# Patient Record
Sex: FEMALE | Race: WHITE | Employment: OTHER | URBAN - METROPOLITAN AREA
[De-identification: names, ages, dates, MRNs, and addresses within clinical notes are randomized per-mention and may not be internally consistent; named-entity substitution may affect disease eponyms.]

---

## 2020-08-04 ENCOUNTER — TRANSFERRED RECORDS (OUTPATIENT)
Dept: HEALTH INFORMATION MANAGEMENT | Facility: CLINIC | Age: 45
End: 2020-08-04

## 2020-08-27 ENCOUNTER — TELEPHONE (OUTPATIENT)
Dept: ADMINISTRATIVE | Facility: CLINIC | Age: 45
End: 2020-08-27

## 2020-09-04 ENCOUNTER — VIRTUAL VISIT (OUTPATIENT)
Dept: ONCOLOGY | Facility: CLINIC | Age: 45
End: 2020-09-04
Payer: MEDICARE

## 2020-09-04 VITALS — BODY MASS INDEX: 39.9 KG/M2 | WEIGHT: 186 LBS

## 2020-09-04 DIAGNOSIS — E66.9 OBESITY: Primary | ICD-10-CM

## 2020-09-04 PROCEDURE — 97802 MEDICAL NUTRITION INDIV IN: CPT | Mod: 95 | Performed by: DIETITIAN, REGISTERED

## 2020-09-04 NOTE — PROGRESS NOTES
"Meeta Moore is a 44 year old female who is being evaluated via a billable telephone visit.      The patient has been notified of following:     \"This telephone visit will be conducted via a call between you and your physician/provider. We have found that certain health care needs can be provided without the need for a physical exam.  This service lets us provide the care you need with a short phone conversation.  If a prescription is necessary we can send it directly to your pharmacy.  If lab work is needed we can place an order for that and you can then stop by our lab to have the test done at a later time.    Telephone visits are billed at different rates depending on your insurance coverage. During this emergency period, for some insurers they may be billed the same as an in-person visit.  Please reach out to your insurance provider with any questions.    If during the course of the call the physician/provider feels a telephone visit is not appropriate, you will not be charged for this service.\"    Patient has given verbal consent for Telephone visit?  Yes    What phone number would you like to be contacted at? [phone]    Phone call duration: 60 minutes      CLINICAL NUTRITION SERVICES - ASSESSMENT NOTE    Meeta Moore 44 year old referred for MNT related to obesity    Time Spent: 60 minutes  Visit Type: phone  Referring Physician: Lis Alonso CNP 8/27  Pt accompanied by: her mother Tiara    NUTRITION HISTORY  Current diet: general    Meeta's mother, Tiara, tells me that she has been gaining weight since Covid started back in March.    She has noticed that she is drinking more diet coke and eating a lot of simple carbs, fast foods and junk foods.         Diet Recall  Breakfast 1 large bagel with cream cheese OR 3 eggs, 2 pc toast with butter, 1 cup 2% milk   Lunch 3 pc pizza or 2 cheeseburgers   Dinner Take out TGIF burgers, fries, 32 oz diet coke   Snacks Fruit, sweets   Beverages >32 oz diet coke, " "1-2 cup 2% milk, 3-4 cups water   Carry out 2 times/week      ANTHROPOMETRICS  Height: 59\"  Weight: 186 lbs/84kg (per pt mother's report) on home scale  BMI: 40  Weight Status:  Obesity Grade III BMI >40  IBW: 95 lbs  % IBW: 200  Weight History:   Wt Readings from Last 6 Encounters:   09/04/20 84.4 kg (186 lb)   01/07/15 74.4 kg (164 lb)   07/28/09 76.7 kg (169 lb 3.2 oz)   02/12/09 73.9 kg (163 lb)   02/08/07 68 kg (150 lb)   05/15/06 68.9 kg (152 lb)     Dosing Weight: 53kg      Medications/vitamins/minerals/herbals:   Reviewed    Labs:  Labs reviewed    Physical Findings:  NA    ASSESSED NUTRITION NEEDS:  Estimated Energy Needs: <1200 kcals (15-20 Kcal/Kg)  Justification: obese  Estimated Protein Needs: 53 grams protein (1 g pro/Kg)  Justification: maintenance  Estimated Fluid Needs: 1500  mL   Justification: maintenance    MALNUTRITION:  % Weight Loss:  None noted  % Intake:  No decreased intake noted  Subcutaneous Fat Loss:  None observed  Muscle Loss:  None observed  Fluid Retention:  None noted    Malnutrition Diagnosis: Patient does not meet two of the above criteria necessary for diagnosing malnutrition    NUTRITION DIAGNOSIS:  Obesity related to self-monitoring deficit, disordered eating patterns ane excessive energy intake AEB diet recall and BMI >30      INTERVENTIONS  Provided written & verbal education:   Discussed dietary and behavioral modifications to promote weight loss (portion control, meal consistency, measuring foods, 9\"portion plate method, fiber sources, protein sources, eating pace, exercise and avoidance of disordered eating patterns.    Encouraged the following behaviors:  --Start using portion plate model  --Cut back on diet coke to <12 oz as able  --Choose 3 food groups at each meal versus all carbs      Pt verbalize understanding of materials provided during consult.   Patient Understanding: unable to determine (Mother with excellent understanding)  Expected Compliance: unable to " determine (Mother with presumed excellent compliance)    Goals  1.  Aim for <1200kcal /day   2.  Reduce diet coke to <12 oz/day as able  3. Start using a portion plate  4. Weight loss (0.5-1.0 lb /week desirable)      Follow-Up Plans: Pt has RD contact information for questions - scheduled for Oct 2nd at 10am.    MONITORING AND EVALUATION:  -Food intake  -Weight trends    Arline Santa, CHERELLEN, LDN

## 2020-10-02 ENCOUNTER — VIRTUAL VISIT (OUTPATIENT)
Dept: ONCOLOGY | Facility: CLINIC | Age: 45
End: 2020-10-02
Payer: MEDICARE

## 2020-10-02 DIAGNOSIS — E66.9 OBESITY: Primary | ICD-10-CM

## 2020-10-02 PROCEDURE — 999N001193 HC VIDEO/TELEPHONE VISIT; NO CHARGE

## 2020-10-02 PROCEDURE — 97803 MED NUTRITION INDIV SUBSEQ: CPT | Mod: TEL | Performed by: DIETITIAN, REGISTERED

## 2020-10-02 NOTE — LETTER
Date:October 6, 2020      Patient was self referred, no letter generated. Do not send.        [facility] Health Information

## 2020-10-02 NOTE — LETTER
"    10/2/2020         RE: Meeta Moore  [address]  [facility] [address]        Dear Colleague,    Thank you for referring your patient, Meeta Moore, to the [facility]. Please see a copy of my visit note below.    Meeta Moore is a 44 year old female who is being evaluated via a billable telephone visit.      The patient has been notified of following:     \"This telephone visit will be conducted via a call between you and your physician/provider. We have found that certain health care needs can be provided without the need for a physical exam.  This service lets us provide the care you need with a short phone conversation.  If a prescription is necessary we can send it directly to your pharmacy.  If lab work is needed we can place an order for that and you can then stop by our lab to have the test done at a later time.    Telephone visits are billed at different rates depending on your insurance coverage. During this emergency period, for some insurers they may be billed the same as an in-person visit.  Please reach out to your insurance provider with any questions.    If during the course of the call the physician/provider feels a telephone visit is not appropriate, you will not be charged for this service.\"    Patient has given verbal consent for Telephone visit?  Yes    What phone number would you like to be contacted at? Mother, Tiara at [phone]    CLINICAL NUTRITION SERVICES - REASSESSMENT NOTE   EVALUATION OF PREVIOUS PLAN OF CARE:   Meeta Moore 44 year old referred for MNT related to obesity     Time Spent: 30 minutes  Visit Type: phone  Referring Physician: Lis Alonso CNP 8/27  Pt accompanied by: her mother Tiara     Monitoring from previous assessment:   -Food intake - Meeta has made some great diet and behavioral changes over the past 1 month.  Although, she hasn't lost any weight, she is encouraged by her ability to choose smaller portions and " cut back on soda.    -Fluid/beverage intake - She has been drinking <12 oz diet coke, <8 oz juice and >32 oz water. She also drinks 8 oz V8 juice  -Weight trends - no weight loss noted  Wt Readings from Last 4 Encounters:   09/04/20 84.4 kg (186 lb)   01/07/15 74.4 kg (164 lb)   07/28/09 76.7 kg (169 lb 3.2 oz)   02/12/09 73.9 kg (163 lb)         Previous Goals:   1.  Aim for <1200kcal /day - goal not met (not tracking)  2.  Reduce diet coke to <12 oz/day as able - goal met  3. Start using a portion plate - unable to evalulate  4. Weight loss (0.5-1.0 lb /week desirable) - goal not met  Evaluation: Not met   Previous Nutrition Diagnosis:   Obesity related to self-monitoring deficit, disordered eating patterns ane excessive energy intake AEB diet recall and BMI >30  Evaluation: No change   NEW FINDINGS:   --diet changes as mentioned above   CURRENT NUTRITION DIAGNOSIS   Obesity related to self-monitoring deficit, disordered eating patterns ane excessive energy intake AEB diet recall and BMI >30  INTERVENTIONS   Recommendations / Nutrition Prescription   1. 1200kcal - follow meal plan provided to mother, Tiara and [facility]  2.  3 meals/day; minimize snacking to 1 snack/day (<100kcal/snack)  Implementation  General/healthful diet:  --Reviewed above goals and praised patient for behavioral and diet changes thus far.    --Reviewed calorie goals for desired wt loss. Encouraged to start tracking as this will help with accountability.   --Encouraged to had light weight/body weight activities 15-30 minutes/day     Goals:  1.  Aim for <1200kcal /day (meal plan provided)  2.  Reduce diet coke to <12 oz/day as able  3. Start using a portion plate  4. Weight loss (0.5-1.0 lb /week desirable)        Follow-Up Plans: Pt has RD contact information for questions - no follow-up scheduled yet      MONITORING AND EVALUATION:  -Food intake  -Weight trends     Arline Santa, CHERELLEN, LDN      Again, thank you for allowing me to  participate in the care of your patient.        Sincerely,        Arline Santa RD

## 2020-10-02 NOTE — PROGRESS NOTES
"Meeta Moore is a 44 year old female who is being evaluated via a billable telephone visit.      The patient has been notified of following:     \"This telephone visit will be conducted via a call between you and your physician/provider. We have found that certain health care needs can be provided without the need for a physical exam.  This service lets us provide the care you need with a short phone conversation.  If a prescription is necessary we can send it directly to your pharmacy.  If lab work is needed we can place an order for that and you can then stop by our lab to have the test done at a later time.    Telephone visits are billed at different rates depending on your insurance coverage. During this emergency period, for some insurers they may be billed the same as an in-person visit.  Please reach out to your insurance provider with any questions.    If during the course of the call the physician/provider feels a telephone visit is not appropriate, you will not be charged for this service.\"    Patient has given verbal consent for Telephone visit?  Yes    What phone number would you like to be contacted at? Mother, Tiara at [phone]    CLINICAL NUTRITION SERVICES - REASSESSMENT NOTE   EVALUATION OF PREVIOUS PLAN OF CARE:   Meeta Moore 44 year old referred for MNT related to obesity     Time Spent: 30 minutes  Visit Type: phone  Referring Physician: Lis Alonso CNP 8/27  Pt accompanied by: her mother Tiara     Monitoring from previous assessment:   -Food intake - Meeta has made some great diet and behavioral changes over the past 1 month.  Although, she hasn't lost any weight, she is encouraged by her ability to choose smaller portions and cut back on soda.    -Fluid/beverage intake - She has been drinking <12 oz diet coke, <8 oz juice and >32 oz water. She also drinks 8 oz V8 juice  -Weight trends - no weight loss noted  Wt Readings from Last 4 Encounters:   09/04/20 84.4 kg (186 lb)   01/07/15 " 74.4 kg (164 lb)   07/28/09 76.7 kg (169 lb 3.2 oz)   02/12/09 73.9 kg (163 lb)         Previous Goals:   1.  Aim for <1200kcal /day - goal not met (not tracking)  2.  Reduce diet coke to <12 oz/day as able - goal met  3. Start using a portion plate - unable to evalulate  4. Weight loss (0.5-1.0 lb /week desirable) - goal not met  Evaluation: Not met   Previous Nutrition Diagnosis:   Obesity related to self-monitoring deficit, disordered eating patterns ane excessive energy intake AEB diet recall and BMI >30  Evaluation: No change   NEW FINDINGS:   --diet changes as mentioned above   CURRENT NUTRITION DIAGNOSIS   Obesity related to self-monitoring deficit, disordered eating patterns ane excessive energy intake AEB diet recall and BMI >30  INTERVENTIONS   Recommendations / Nutrition Prescription   1. 1200kcal - follow meal plan provided to mother, Tiara and [facility]  2.  3 meals/day; minimize snacking to 1 snack/day (<100kcal/snack)  Implementation  General/healthful diet:  --Reviewed above goals and praised patient for behavioral and diet changes thus far.    --Reviewed calorie goals for desired wt loss. Encouraged to start tracking as this will help with accountability.   --Encouraged to had light weight/body weight activities 15-30 minutes/day     Goals:  1.  Aim for <1200kcal /day (meal plan provided)  2.  Reduce diet coke to <12 oz/day as able  3. Start using a portion plate  4. Weight loss (0.5-1.0 lb /week desirable)        Follow-Up Plans: Pt has RD contact information for questions - no follow-up scheduled yet      MONITORING AND EVALUATION:  -Food intake  -Weight trends     Arline Santa, CHERELLEN, LDN

## 2025-01-27 ENCOUNTER — LAB REQUISITION (OUTPATIENT)
Dept: LAB | Facility: CLINIC | Age: 50
End: 2025-01-27
Payer: MEDICARE

## 2025-01-27 DIAGNOSIS — D64.9 ANEMIA, UNSPECIFIED: ICD-10-CM

## 2025-01-27 DIAGNOSIS — E78.5 HYPERLIPIDEMIA, UNSPECIFIED: ICD-10-CM

## 2025-01-27 DIAGNOSIS — E03.9 HYPOTHYROIDISM, UNSPECIFIED: ICD-10-CM

## 2025-01-27 LAB
ALBUMIN SERPL BCG-MCNC: 3.7 G/DL (ref 3.5–5.2)
ALP SERPL-CCNC: 95 U/L (ref 40–150)
ALT SERPL W P-5'-P-CCNC: 15 U/L (ref 0–50)
ANION GAP SERPL CALCULATED.3IONS-SCNC: 12 MMOL/L (ref 7–15)
AST SERPL W P-5'-P-CCNC: 19 U/L (ref 0–45)
BASOPHILS # BLD AUTO: 0.1 10E3/UL (ref 0–0.2)
BASOPHILS NFR BLD AUTO: 2 %
BILIRUB SERPL-MCNC: <0.2 MG/DL
BUN SERPL-MCNC: 16.6 MG/DL (ref 6–20)
CALCIUM SERPL-MCNC: 8.8 MG/DL (ref 8.8–10.4)
CHLORIDE SERPL-SCNC: 102 MMOL/L (ref 98–107)
CREAT SERPL-MCNC: 0.95 MG/DL (ref 0.51–0.95)
EGFRCR SERPLBLD CKD-EPI 2021: 74 ML/MIN/1.73M2
EOSINOPHIL # BLD AUTO: 0.1 10E3/UL (ref 0–0.7)
EOSINOPHIL NFR BLD AUTO: 1 %
ERYTHROCYTE [DISTWIDTH] IN BLOOD BY AUTOMATED COUNT: 13.4 % (ref 10–15)
EST. AVERAGE GLUCOSE BLD GHB EST-MCNC: 108 MG/DL
GLUCOSE SERPL-MCNC: 101 MG/DL (ref 70–99)
HBA1C MFR BLD: 5.4 %
HCO3 SERPL-SCNC: 22 MMOL/L (ref 22–29)
HCT VFR BLD AUTO: 40.7 % (ref 35–47)
HGB BLD-MCNC: 13.8 G/DL (ref 11.7–15.7)
IMM GRANULOCYTES # BLD: 0 10E3/UL
IMM GRANULOCYTES NFR BLD: 0 %
LYMPHOCYTES # BLD AUTO: 1.7 10E3/UL (ref 0.8–5.3)
LYMPHOCYTES NFR BLD AUTO: 31 %
MCH RBC QN AUTO: 33 PG (ref 26.5–33)
MCHC RBC AUTO-ENTMCNC: 33.9 G/DL (ref 31.5–36.5)
MCV RBC AUTO: 97 FL (ref 78–100)
MONOCYTES # BLD AUTO: 0.6 10E3/UL (ref 0–1.3)
MONOCYTES NFR BLD AUTO: 10 %
NEUTROPHILS # BLD AUTO: 3.1 10E3/UL (ref 1.6–8.3)
NEUTROPHILS NFR BLD AUTO: 55 %
NRBC # BLD AUTO: 0 10E3/UL
NRBC BLD AUTO-RTO: 0 /100
PLATELET # BLD AUTO: 309 10E3/UL (ref 150–450)
POTASSIUM SERPL-SCNC: 4.2 MMOL/L (ref 3.4–5.3)
PROT SERPL-MCNC: 7 G/DL (ref 6.4–8.3)
RBC # BLD AUTO: 4.18 10E6/UL (ref 3.8–5.2)
SODIUM SERPL-SCNC: 136 MMOL/L (ref 135–145)
TSH SERPL DL<=0.005 MIU/L-ACNC: 1.14 UIU/ML (ref 0.3–4.2)
WBC # BLD AUTO: 5.5 10E3/UL (ref 4–11)

## 2025-01-28 LAB
CHOLEST SERPL-MCNC: 171 MG/DL
FASTING STATUS PATIENT QL REPORTED: NO
HDLC SERPL-MCNC: 67 MG/DL
LDLC SERPL CALC-MCNC: 81 MG/DL
NONHDLC SERPL-MCNC: 104 MG/DL
TRIGL SERPL-MCNC: 115 MG/DL